# Patient Record
Sex: MALE | Race: AMERICAN INDIAN OR ALASKA NATIVE | NOT HISPANIC OR LATINO | URBAN - METROPOLITAN AREA
[De-identification: names, ages, dates, MRNs, and addresses within clinical notes are randomized per-mention and may not be internally consistent; named-entity substitution may affect disease eponyms.]

---

## 2020-09-17 ENCOUNTER — EMERGENCY (EMERGENCY)
Facility: HOSPITAL | Age: 30
LOS: 1 days | Discharge: HOME | End: 2020-09-17
Attending: EMERGENCY MEDICINE
Payer: SELF-PAY

## 2020-09-17 VITALS
HEIGHT: 71 IN | TEMPERATURE: 98 F | DIASTOLIC BLOOD PRESSURE: 80 MMHG | SYSTOLIC BLOOD PRESSURE: 133 MMHG | OXYGEN SATURATION: 98 % | HEART RATE: 82 BPM | RESPIRATION RATE: 16 BRPM | WEIGHT: 167.99 LBS

## 2020-09-17 PROCEDURE — 99283 EMERGENCY DEPT VISIT LOW MDM: CPT | Mod: 25

## 2020-09-17 RX ORDER — IBUPROFEN 200 MG
600 TABLET ORAL ONCE
Refills: 0 | Status: COMPLETED | OUTPATIENT
Start: 2020-09-17 | End: 2020-09-17

## 2020-09-17 RX ORDER — IBUPROFEN 200 MG
1 TABLET ORAL
Qty: 28 | Refills: 0
Start: 2020-09-17 | End: 2020-09-23

## 2020-09-17 RX ADMIN — Medication 600 MILLIGRAM(S): at 13:57

## 2020-09-17 RX ADMIN — Medication 600 MILLIGRAM(S): at 14:12

## 2020-09-17 NOTE — ED ADULT NURSE NOTE - OBJECTIVE STATEMENT
Pt c/o of left leg/calf pain. Pt states he was" running and heard a pop". Denies falls. No acute distress noted, denies chest pain, no shortness of breath indicated. Safety maintained.

## 2020-09-17 NOTE — ED PROVIDER NOTE - CLINICAL SUMMARY MEDICAL DECISION MAKING FREE TEXT BOX
pt while running today felt a pop to left calf muscles ass difficulty walking.locally looks like injury to lefy calf/gastronemius muscles.p

## 2020-09-17 NOTE — ED PROVIDER NOTE - OBJECTIVE STATEMENT
pt co of pain to left calf region today while running.pt heard a pop and difficulty walking.pain scale 7/10

## 2020-09-17 NOTE — ED PROVIDER NOTE - PATIENT PORTAL LINK FT
You can access the FollowMyHealth Patient Portal offered by [facility] by registering at the following website: [email or web address]. By joining [employer]’s FollowMyHealth portal, you will also be able to view your health information using other applications (apps) compatible with our system.

## 2023-03-20 NOTE — ED PROVIDER NOTE - NEURO NEGATIVE STATEMENT, MLM
no loss of consciousness, no gait abnormality, no headache, no sensory deficits, and no weakness.
15